# Patient Record
Sex: FEMALE | Race: WHITE | NOT HISPANIC OR LATINO | URBAN - METROPOLITAN AREA
[De-identification: names, ages, dates, MRNs, and addresses within clinical notes are randomized per-mention and may not be internally consistent; named-entity substitution may affect disease eponyms.]

---

## 2019-04-13 ENCOUNTER — EMERGENCY (EMERGENCY)
Facility: HOSPITAL | Age: 69
LOS: 1 days | Discharge: HOME | End: 2019-04-13
Attending: EMERGENCY MEDICINE
Payer: COMMERCIAL

## 2019-04-13 VITALS
HEART RATE: 92 BPM | RESPIRATION RATE: 20 BRPM | SYSTOLIC BLOOD PRESSURE: 168 MMHG | TEMPERATURE: 99 F | OXYGEN SATURATION: 97 % | DIASTOLIC BLOOD PRESSURE: 85 MMHG

## 2019-04-13 PROCEDURE — 99218: CPT

## 2019-04-13 PROCEDURE — 70450 CT HEAD/BRAIN W/O DYE: CPT | Mod: 26

## 2019-04-13 NOTE — ED STATDOCS - PROGRESS NOTE DETAILS
BRITTANY Gillette NOTE: Pt evaluated at bedside. Episode totally resolved. Checked blood sugar prior to arrival, was 127 at home. Pt evaluated prior by intake physician. Otherwise HPI/PE/ROS as noted above. Will follow up plan per intake physician. BRITTANY Gillette NOTE: Reviewed Ct with Florencia, will place pt in OBS unit for further workup.  Pt agreeable to plan.

## 2019-04-13 NOTE — ED CDU PROVIDER INITIAL DAY NOTE - PHYSICAL EXAMINATION
Neuro: sensation is normal and strength is normal. No pronator drift. Cranial nerves II-XII intact. Normal finger- nose/ heel - shin.  No aphasia/ dysarthria/ normal naming.

## 2019-04-13 NOTE — ED CDU PROVIDER INITIAL DAY NOTE - ATTENDING CONTRIBUTION TO CARE
I personally saw the patient with the PA, and completed the key components of the history and physical exam. I then discussed the management plan with the PA.      Agree with plan as pt c/o new onset visual changes inconsistent with prior ocular migraine; neurologically intact.

## 2019-04-13 NOTE — ED STATDOCS - NEUROLOGICAL, MLM
sensation is normal and strength is normal. No pronator drift. Cranial nerves intact. sensation is normal and strength is normal. No pronator drift. Cranial nerves II-XII intact. Normal finger- nose/ heel - shin.  No aphasia/ dysarthria/ normal naming.

## 2019-04-13 NOTE — ED STATDOCS - MUSCULOSKELETAL, MLM
range of motion is not limited and there is no muscle tenderness. 5/5 strength RYAN extremities, sensation is normal in RYAN extremities

## 2019-04-13 NOTE — ED ADULT TRIAGE NOTE - CHIEF COMPLAINT QUOTE
Pt reports seeing "color waves and spots" after lifting her head up fast 1 hour ago, symptom has since resolved. Hx DM and HTN

## 2019-04-13 NOTE — ED STATDOCS - CLINICAL SUMMARY MEDICAL DECISION MAKING FREE TEXT BOX
67 y/o F with PMHx of HTN, DM and HLD , presents to ED, c/o visual changes in the left eye. Obtain CT head. Outpatient, PCP, and ophthalmologist follow up. 67 y/o F with PMHx of HTN, DM and HLD , optic migraine, presents to ED, c/o visual changes in the left eye which were transient, lasted 3 minutes and described as similar to scintillating scotoma. Pt otherwise neurologically intact; symptoms resolved.  Will Obtain CT head, likely recommend Outpatient, PCP, and ophthalmologist follow up.

## 2019-04-13 NOTE — ED CDU PROVIDER INITIAL DAY NOTE - MEDICAL DECISION MAKING DETAILS
Pt admitted to observation for TIA workup. Will get MRI, ECHO, fasting lipids, carotid ultrasound and neurology consult.

## 2019-04-13 NOTE — ED STATDOCS - EYES, MLM
clear bilaterally.  Pupils equal, round, and reactive to light, 5/5. Peripheral vision intact, including 2 numbers. Sharp optic disc margins bilaterally. Grossly normal funduscopic exam clear bilaterally.  Pupils equal, round, and reactive to light, 5/5. Peripheral vision intact, including to numbers. Grossly normal funduscopic exam. Sharp optic disc margins bilaterally.

## 2019-04-13 NOTE — ED STATDOCS - ATTENDING CONTRIBUTION TO CARE
I, Kait Don, performed the initial face to face bedside interview with this patient regarding history of present illness, review of symptoms and relevant past medical, social and family history.  I completed an independent physical examination.  I was the initial provider who evaluated this patient. I have signed out the follow up of any pending tests (i.e. labs, radiological studies) to the ACP.  I have communicated the patient’s plan of care and disposition with the ACP.  The history, relevant review of systems, past medical and surgical history, medical decision making, and physical examination was documented by the scribe in my presence and I attest to the accuracy of the documentation.

## 2019-04-13 NOTE — ED CDU PROVIDER INITIAL DAY NOTE - OBJECTIVE STATEMENT
69 y/o F with PMHx of HTN, DM and HLD, presents to ED, c/o transient visual changes in the left eye. States she had poor choices of food today, and did not eat and drink on time. She was moving furniture today and then went into bed around 4:30pm, hunched over when she began seeing red, blue, white wavy lines out of the corner of her left eye. She then left bed and took 2 baby aspirin. She has a history of migraines and optical migraines. Now she just gets headaches from time to time. Denies, N/V, tingling in arms and legs, slurred speech, no blurry vision, weakness, neck pain, flashing lights, curtain over visual fields. Denies any trauma/ neck manipulation/ neck pain/ tinnitus. Pt states she sees her opthalmologist regularly Q6 months due to the optic migraines and has cataract to R eye.  Wears glasses. 67 y/o F with PMHx of HTN, DM and HLD, presents to ED, c/o transient visual changes in the left eye. States she had poor choices of food today, and did not eat and drink on time. She was moving furniture today and then went into bed around 4:30pm, hunched over when she began seeing red, blue, white wavy lines out of the corner of her left eye. She then left bed and took 2 baby aspirin. She has a history of migraines and optical migraines. Now she just gets headaches from time to time. Denies, N/V, tingling in arms and legs, slurred speech, no blurry vision, weakness, neck pain, flashing lights, curtain over visual fields. Denies any trauma/ neck manipulation/ neck pain/ tinnitus. Pt states she sees her opthalmologist regularly Q6 months due to the optic migraines and has cataract to R eye.  Wears glasses.  Former smoker. 69 y/o F with PMHx of HTN, DM and HLD, presents to ED, c/o transient visual changes in the left eye. States she had poor choices of food today, and did not eat and drink on time. She was moving furniture today and then went into bed around 4:30pm, hunched over when she began seeing red, blue, white wavy lines out of the corner of her left eye. She then left bed and took 2 baby aspirin. She has a history of migraines and optical migraines. Now she just gets headaches from time to time. Denies, N/V, tingling in arms and legs, slurred speech, no blurry vision, weakness, neck pain, flashing lights, curtain over visual fields. Denies any trauma/ neck manipulation/ neck pain/ tinnitus. Pt states she sees her opthalmologist regularly Q6 months due to the optic migraines and has cataract to R eye. Pt wears glasses.

## 2019-04-13 NOTE — ED STATDOCS - OBJECTIVE STATEMENT
67 y/o F with PMHx of HTN, DM and HLD , presents to ED, c/o visual changes in the left eye. States she had poor choices of food today, and did not eat and drink on time. She was moving furniture today and then went into bed around 4:30pm, hunched over when she began seeing red, blue, white wavy lines out of her left eye. She then left bed and took 2 baby aspirin. She has a history of migraines and optical migraines. Now she just gets headaches from time to time. Denies, N/V, no tingling in arms and legs, no blurry vision.  Wears glasses.   Former smoker.   Cataract surgery and breast surgery.   PCP: Owen Cantrell NP 69 y/o F with PMHx of HTN, DM and HLD, presents to ED, c/o transient visual changes in the left eye. States she had poor choices of food today, and did not eat and drink on time. She was moving furniture today and then went into bed around 4:30pm, hunched over when she began seeing red, blue, white wavy lines out of the corner of her left eye. She then left bed and took 2 baby aspirin. She has a history of migraines and optical migraines. Now she just gets headaches from time to time. Denies, N/V, tingling in arms and legs, slurred speech, no blurry vision, weakness, neck pain, flashing lights, curtain over visual fields. Denies any trauma/ neck manipulation/ neck pain/ tinnitus. Pt states she sees her opthalmologist regularly Q6 months due to the optic migraines and has cataract to R eye.  Wears glasses.   Former smoker.   Cataract surgery and breast surgery.   PCP: Owen Cantrell NP

## 2019-04-14 VITALS
OXYGEN SATURATION: 99 % | DIASTOLIC BLOOD PRESSURE: 76 MMHG | TEMPERATURE: 99 F | HEART RATE: 80 BPM | RESPIRATION RATE: 16 BRPM | SYSTOLIC BLOOD PRESSURE: 155 MMHG

## 2019-04-14 DIAGNOSIS — H26.9 UNSPECIFIED CATARACT: Chronic | ICD-10-CM

## 2019-04-14 LAB
ALBUMIN SERPL ELPH-MCNC: 4.6 G/DL — SIGNIFICANT CHANGE UP (ref 3.3–5.2)
ALP SERPL-CCNC: 93 U/L — SIGNIFICANT CHANGE UP (ref 40–120)
ALT FLD-CCNC: 11 U/L — SIGNIFICANT CHANGE UP
ANION GAP SERPL CALC-SCNC: 15 MMOL/L — SIGNIFICANT CHANGE UP (ref 5–17)
APPEARANCE UR: CLEAR — SIGNIFICANT CHANGE UP
APTT BLD: 32.5 SEC — SIGNIFICANT CHANGE UP (ref 27.5–36.3)
AST SERPL-CCNC: 13 U/L — SIGNIFICANT CHANGE UP
BACTERIA # UR AUTO: ABNORMAL
BASOPHILS # BLD AUTO: 0 K/UL — SIGNIFICANT CHANGE UP (ref 0–0.2)
BASOPHILS NFR BLD AUTO: 0.3 % — SIGNIFICANT CHANGE UP (ref 0–2)
BILIRUB SERPL-MCNC: 0.3 MG/DL — LOW (ref 0.4–2)
BILIRUB UR-MCNC: NEGATIVE — SIGNIFICANT CHANGE UP
BUN SERPL-MCNC: 20 MG/DL — SIGNIFICANT CHANGE UP (ref 8–20)
CALCIUM SERPL-MCNC: 9.3 MG/DL — SIGNIFICANT CHANGE UP (ref 8.6–10.2)
CHLORIDE SERPL-SCNC: 102 MMOL/L — SIGNIFICANT CHANGE UP (ref 98–107)
CHOLEST SERPL-MCNC: 175 MG/DL — SIGNIFICANT CHANGE UP (ref 110–199)
CO2 SERPL-SCNC: 24 MMOL/L — SIGNIFICANT CHANGE UP (ref 22–29)
COLOR SPEC: YELLOW — SIGNIFICANT CHANGE UP
CREAT SERPL-MCNC: 0.6 MG/DL — SIGNIFICANT CHANGE UP (ref 0.5–1.3)
DIFF PNL FLD: NEGATIVE — SIGNIFICANT CHANGE UP
EOSINOPHIL # BLD AUTO: 0.1 K/UL — SIGNIFICANT CHANGE UP (ref 0–0.5)
EOSINOPHIL NFR BLD AUTO: 1.4 % — SIGNIFICANT CHANGE UP (ref 0–6)
EPI CELLS # UR: SIGNIFICANT CHANGE UP
GLUCOSE SERPL-MCNC: 120 MG/DL — HIGH (ref 70–115)
GLUCOSE UR QL: NEGATIVE MG/DL — SIGNIFICANT CHANGE UP
HCT VFR BLD CALC: 41.1 % — SIGNIFICANT CHANGE UP (ref 37–47)
HDLC SERPL-MCNC: 74 MG/DL — SIGNIFICANT CHANGE UP
HGB BLD-MCNC: 13.5 G/DL — SIGNIFICANT CHANGE UP (ref 12–16)
INR BLD: 1.14 RATIO — SIGNIFICANT CHANGE UP (ref 0.88–1.16)
KETONES UR-MCNC: NEGATIVE — SIGNIFICANT CHANGE UP
LEUKOCYTE ESTERASE UR-ACNC: ABNORMAL
LIPID PNL WITH DIRECT LDL SERPL: 83 MG/DL — SIGNIFICANT CHANGE UP
LYMPHOCYTES # BLD AUTO: 2.1 K/UL — SIGNIFICANT CHANGE UP (ref 1–4.8)
LYMPHOCYTES # BLD AUTO: 32.4 % — SIGNIFICANT CHANGE UP (ref 20–55)
MCHC RBC-ENTMCNC: 28.2 PG — SIGNIFICANT CHANGE UP (ref 27–31)
MCHC RBC-ENTMCNC: 32.8 G/DL — SIGNIFICANT CHANGE UP (ref 32–36)
MCV RBC AUTO: 85.8 FL — SIGNIFICANT CHANGE UP (ref 81–99)
MONOCYTES # BLD AUTO: 0.6 K/UL — SIGNIFICANT CHANGE UP (ref 0–0.8)
MONOCYTES NFR BLD AUTO: 8.7 % — SIGNIFICANT CHANGE UP (ref 3–10)
NEUTROPHILS # BLD AUTO: 3.7 K/UL — SIGNIFICANT CHANGE UP (ref 1.8–8)
NEUTROPHILS NFR BLD AUTO: 56.9 % — SIGNIFICANT CHANGE UP (ref 37–73)
NITRITE UR-MCNC: NEGATIVE — SIGNIFICANT CHANGE UP
PH UR: 5 — SIGNIFICANT CHANGE UP (ref 5–8)
PLATELET # BLD AUTO: 220 K/UL — SIGNIFICANT CHANGE UP (ref 150–400)
POTASSIUM SERPL-MCNC: 4.2 MMOL/L — SIGNIFICANT CHANGE UP (ref 3.5–5.3)
POTASSIUM SERPL-SCNC: 4.2 MMOL/L — SIGNIFICANT CHANGE UP (ref 3.5–5.3)
PROT SERPL-MCNC: 8.1 G/DL — SIGNIFICANT CHANGE UP (ref 6.6–8.7)
PROT UR-MCNC: NEGATIVE MG/DL — SIGNIFICANT CHANGE UP
PROTHROM AB SERPL-ACNC: 13.2 SEC — HIGH (ref 10–12.9)
RBC # BLD: 4.79 M/UL — SIGNIFICANT CHANGE UP (ref 4.4–5.2)
RBC # FLD: 14.6 % — SIGNIFICANT CHANGE UP (ref 11–15.6)
RBC CASTS # UR COMP ASSIST: SIGNIFICANT CHANGE UP /HPF (ref 0–4)
SODIUM SERPL-SCNC: 141 MMOL/L — SIGNIFICANT CHANGE UP (ref 135–145)
SP GR SPEC: 1.01 — SIGNIFICANT CHANGE UP (ref 1.01–1.02)
TOTAL CHOLESTEROL/HDL RATIO MEASUREMENT: 2 RATIO — LOW (ref 3.3–7.1)
TRIGL SERPL-MCNC: 92 MG/DL — SIGNIFICANT CHANGE UP (ref 10–200)
TROPONIN T SERPL-MCNC: <0.01 NG/ML — SIGNIFICANT CHANGE UP (ref 0–0.06)
TROPONIN T SERPL-MCNC: <0.01 NG/ML — SIGNIFICANT CHANGE UP (ref 0–0.06)
UROBILINOGEN FLD QL: NEGATIVE MG/DL — SIGNIFICANT CHANGE UP
WBC # BLD: 6.4 K/UL — SIGNIFICANT CHANGE UP (ref 4.8–10.8)
WBC # FLD AUTO: 6.4 K/UL — SIGNIFICANT CHANGE UP (ref 4.8–10.8)
WBC UR QL: SIGNIFICANT CHANGE UP

## 2019-04-14 PROCEDURE — 80053 COMPREHEN METABOLIC PANEL: CPT

## 2019-04-14 PROCEDURE — 99220: CPT

## 2019-04-14 PROCEDURE — 99284 EMERGENCY DEPT VISIT MOD MDM: CPT | Mod: 25

## 2019-04-14 PROCEDURE — 93880 EXTRACRANIAL BILAT STUDY: CPT | Mod: 26

## 2019-04-14 PROCEDURE — 70551 MRI BRAIN STEM W/O DYE: CPT | Mod: 26

## 2019-04-14 PROCEDURE — 81001 URINALYSIS AUTO W/SCOPE: CPT

## 2019-04-14 PROCEDURE — 93306 TTE W/DOPPLER COMPLETE: CPT | Mod: 26

## 2019-04-14 PROCEDURE — 93010 ELECTROCARDIOGRAM REPORT: CPT

## 2019-04-14 PROCEDURE — 70551 MRI BRAIN STEM W/O DYE: CPT

## 2019-04-14 PROCEDURE — 99217: CPT

## 2019-04-14 PROCEDURE — 85730 THROMBOPLASTIN TIME PARTIAL: CPT

## 2019-04-14 PROCEDURE — 93306 TTE W/DOPPLER COMPLETE: CPT

## 2019-04-14 PROCEDURE — 93880 EXTRACRANIAL BILAT STUDY: CPT

## 2019-04-14 PROCEDURE — 85610 PROTHROMBIN TIME: CPT

## 2019-04-14 PROCEDURE — 85027 COMPLETE CBC AUTOMATED: CPT

## 2019-04-14 PROCEDURE — 80061 LIPID PANEL: CPT

## 2019-04-14 PROCEDURE — G0378: CPT

## 2019-04-14 PROCEDURE — 93005 ELECTROCARDIOGRAM TRACING: CPT

## 2019-04-14 PROCEDURE — 36415 COLL VENOUS BLD VENIPUNCTURE: CPT

## 2019-04-14 PROCEDURE — 84484 ASSAY OF TROPONIN QUANT: CPT

## 2019-04-14 PROCEDURE — 70450 CT HEAD/BRAIN W/O DYE: CPT

## 2019-04-14 RX ORDER — ATORVASTATIN CALCIUM 80 MG/1
20 TABLET, FILM COATED ORAL AT BEDTIME
Qty: 0 | Refills: 0 | Status: DISCONTINUED | OUTPATIENT
Start: 2019-04-14 | End: 2019-04-18

## 2019-04-14 RX ORDER — LISINOPRIL 2.5 MG/1
40 TABLET ORAL DAILY
Qty: 0 | Refills: 0 | Status: DISCONTINUED | OUTPATIENT
Start: 2019-04-14 | End: 2019-04-18

## 2019-04-14 NOTE — ED ADULT NURSE REASSESSMENT NOTE - NS ED NURSE REASSESS COMMENT FT1
assumed pt care from previous Rn Adalberto Cormier.  pt transported to CDU-8 for observation. pt  A&Ox3;  pt stated she was moving some  furniture in her  house, then started to experience some red and blue lines across her left eye lasting about 30mins. denies hitting head or any trauma.  pt also stated that she took a 2 baby aspirin and called her friend who insisted she come  to emergency room.   pt verbalizes improvement since coming to ED. At this time pt denies headache, vision changes, Chest pain or SOB. B/L lungs clear, normal s1&s2 heard on ausculation. (+) pedal pulses, skin warm/dry intact. IV patent left AC. VSS and documented as per flow sheet.  plan of care reviewed and pt verbalizes understanding. bed in lowest position, call bell within reach and safety maintained. monitoring ongoing for any changes.

## 2019-04-14 NOTE — ED ADULT NURSE REASSESSMENT NOTE - NS ED NURSE REASSESS COMMENT FT1
Report received from CDU RN Rafal Wright. Assumed care of patient. No obvious distress noted. Pt resting comfortably in bed.

## 2019-04-14 NOTE — CONSULT NOTE ADULT - SUBJECTIVE AND OBJECTIVE BOX
[facility]                                     [facility] [place]                                 Kiana Preciado, [address]                                  [address]                                        [place], [place], [address]                                             [phone]    CC: vision changes  HPI: The patient is a 68y Female who presented with scintillated squiggly lines in her peripheral left visual field yesterday for about 30 minutes.  there was no subsequent headache.  She has a history of migraines and optical migraines in the past, but they have appeared differnetly in past.  She is currently aty her neurologic baseline.  Neurology eval is requested.    PAST MEDICAL & SURGICAL HISTORY:  DM (diabetes mellitus)  High cholesterol  Hypertension  Acute cataract      MEDICATIONS  (STANDING):  atorvastatin 20 milliGRAM(s) Oral at bedtime  lisinopril 40 milliGRAM(s) Oral daily  sitaGLIPtin 50 milliGRAM(s) Oral daily    MEDICATIONS  (PRN):      Allergies    Biaxin (Rash)    Intolerances        SOCIAL HISTORY:  former tob,   no alcohol   no drugs    FAMILY HISTORY:  n/c      ROS: 14 point ROS negative other than what is present in HPI or below    Vital Signs Last 24 Hrs  T(C): 37.1 (14 Apr 2019 10:47), Max: 37.1 (14 Apr 2019 10:47)  T(F): 98.7 (14 Apr 2019 10:47), Max: 98.7 (14 Apr 2019 10:47)  HR: 80 (14 Apr 2019 10:47) (72 - 92)  BP: 155/76 (14 Apr 2019 10:47) (130/78 - 168/85)  BP(mean): --  RR: 16 (14 Apr 2019 10:47) (16 - 20)  SpO2: 99% (14 Apr 2019 10:47) (97% - 100%)      General: NAD    Detailed Neurologic Exam:    Mental status: The patient is awake and alert and has normal attention span.  The patient is fully oriented in 3 spheres. The patient is oriented to current events. The patient is able to name objects, follow commands, repeat sentences.    Cranial nerves: Pupils equal and react symmetrically to light. There is no visual field deficit to confrontation. Extraocular motion is full with no nystagmus. There is no ptosis. Facial sensation is intact. Facial musculature is symmetric. Palate elevates symmetrically. Shoulder shrug is normal. Tongue is midline.    Motor: There is normal bulk and tone.  There is no tremor.  Strength is 5/5 in the right arm and leg.   Strength is 5/5 in the left arm and leg.    Sensation: Intact to light touch and pin in 4 extremities    Reflexes: 2+ throughout and plantar responses are flexor.    Cerebellar: There is no dysmetria on finger to nose testing.    Gait : deferred    LABS:                         13.5   6.4   )-----------( 220      ( 14 Apr 2019 01:02 )             41.1       04-14    141  |  102  |  20.0  ----------------------------<  120<H>  4.2   |  24.0  |  0.60    Ca    9.3      14 Apr 2019 01:02    TPro  8.1  /  Alb  4.6  /  TBili  0.3<L>  /  DBili  x   /  AST  13  /  ALT  11  /  AlkPhos  93  04-14      PT/INR - ( 14 Apr 2019 01:01 )   PT: 13.2 sec;   INR: 1.14 ratio         PTT - ( 14 Apr 2019 01:01 )  PTT:32.5 sec    RADIOLOGY & ADDITIONAL STUDIES (independently reviewed unless otherwise noted):  MRI brain- no acute CVA. mass or blood    carotid duplex no significant stenosis    < from: TTE Echo Complete w/Doppler (04.14.19 @ 09:06) >  Summary:   1. Left ventricular ejection fraction, by visual estimation, is 60 to   65%.   2. Spectral Doppler shows impaired relaxation pattern of left   ventricular myocardial filling (Grade I diastolic dysfunction).   3. Mild mitral valve regurgitation.   4. Mild tricuspid valve regurgitation.

## 2019-04-14 NOTE — ED ADULT NURSE REASSESSMENT NOTE - NS ED NURSE REASSESS COMMENT FT1
Pt alert and oriented. resting in stretcher, no signs of distress noted. Handoff report given to Craig Stewart and Abimbola Foy RN and pt's safety maintained. RN with no questions or concerns at this time

## 2019-04-14 NOTE — ED ADULT NURSE REASSESSMENT NOTE - NS ED NURSE REASSESS COMMENT FT1
Pt returned from testing, awaiting further testing. No neuro deficits noted. Pt denies pain, has no complaints, in no obvious distress, resting comfortably in bed. NSR on CM. Will continue to monitor.

## 2019-04-14 NOTE — ED CDU PROVIDER SUBSEQUENT DAY NOTE - HISTORY
69 y/o F with PMHx of HTN, DM and HLD, presents to ED, c/o transient visual changes in the left eye. States she had poor choices of food today, and did not eat and drink on time. She was moving furniture today and then went into bed around 4:30pm, hunched over when she began seeing red, blue, white wavy lines out of the corner of her left eye. She then left bed and took 2 baby aspirin. She has a history of migraines and optical migraines that have since resolved.   Denies any visual disturbances.  Pt states she sees her ophthalmologist regularly Q6 months due to the optic migraines and has cataract to R eye.  Wears glasses.

## 2019-04-14 NOTE — ED ADULT NURSE REASSESSMENT NOTE - NS ED NURSE REASSESS COMMENT FT1
Purposeful rounding completed on patient. pt ambulated to/from bathroom with steady gait. denies any complaints of chest pain, SOB or dizziness. resting in stretcher no apparent distress noted.  VSS and documented as per flow sheet.  plan care of care reviewed. Bed in lowest position, call bell within reach, and safety maintained. monitoring on-going for any changes.

## 2019-04-14 NOTE — ED ADULT NURSE REASSESSMENT NOTE - NS ED NURSE REASSESS COMMENT FT1
Pt A&Ox4, VSS, no neurological deficits noted, behavior appropriate to situation, PERRL. Pt denies any vision changes/dizziness/HA. Pt denies any pain. Awaiting results and final dispo. Resting comfortably in bed. Will continue to monitor.

## 2019-04-14 NOTE — ED CDU PROVIDER DISPOSITION NOTE - CLINICAL COURSE
69 y/o F presented to ED with self limiting episode of visual disturbance.  CT head, MRI, carotid dopplers unremarkable, TTE without acute pathology.  Seen by neurology and dx migraine with aura.  Will d/c home with f/u pcp Dr. Carmona.

## 2019-04-14 NOTE — ED ADULT NURSE REASSESSMENT NOTE - NSIMPLEMENTINTERV_GEN_ALL_ED
Implemented All Universal Safety Interventions:  [place] to call system. Call bell, personal items and telephone within reach. Instruct patient to call for assistance. Room bathroom lighting operational. Non-slip footwear when patient is off stretcher. Physically safe environment: no spills, clutter or unnecessary equipment. Stretcher in lowest position, wheels locked, appropriate side rails in place.

## 2019-04-14 NOTE — ED CDU PROVIDER DISPOSITION NOTE - ATTENDING CONTRIBUTION TO CARE
seen with acp  neurologically stable workup is negative  Imp migraine  Agree with acps assessment hx and physical

## 2019-04-14 NOTE — CONSULT NOTE ADULT - ASSESSMENT
The patient is a 68y Female who is followed by neurology because of vision changes    Visual disturbance  Her symptoms are consistent with migraine aura  MRI brain did not show acute pathology  would not start any medications for this at this time    Neurologically cleared for discharge.  d/w Pam Barr NP    Thank you for allowing me to participate in the care of your patient    Aung Bruno MD, PhD   [phone]

## 2019-04-14 NOTE — ED ADULT NURSE REASSESSMENT NOTE - NS ED NURSE REASSESS COMMENT FT1
Pt prepared for and awaiting MRI and echo. Pt verbalized understanding of plan of care and wait time. Pt denies PO BP med and PO DM med; pt stated at home she regularly takes these meds at night.

## 2019-04-14 NOTE — ED CDU PROVIDER SUBSEQUENT DAY NOTE - ATTENDING CONTRIBUTION TO CARE
seen with acp  c/o transient loss of vision  MRI neuro consult obtained  carotid ultrasound pending  patient is clinically stable  Agree with acps assessment hx and physical

## 2019-04-15 PROBLEM — Z00.00 ENCOUNTER FOR PREVENTIVE HEALTH EXAMINATION: Status: ACTIVE | Noted: 2019-04-15

## 2022-09-13 ENCOUNTER — NON-APPOINTMENT (OUTPATIENT)
Age: 72
End: 2022-09-13

## 2022-09-13 ENCOUNTER — APPOINTMENT (OUTPATIENT)
Dept: OPHTHALMOLOGY | Facility: CLINIC | Age: 72
End: 2022-09-13

## 2022-09-13 PROCEDURE — 92014 COMPRE OPH EXAM EST PT 1/>: CPT

## 2023-08-07 ENCOUNTER — APPOINTMENT (OUTPATIENT)
Dept: OPHTHALMOLOGY | Facility: CLINIC | Age: 73
End: 2023-08-07
Payer: MEDICARE

## 2023-08-07 ENCOUNTER — NON-APPOINTMENT (OUTPATIENT)
Age: 73
End: 2023-08-07

## 2023-08-07 PROCEDURE — 92012 INTRM OPH EXAM EST PATIENT: CPT

## 2023-08-07 PROCEDURE — 92201 OPSCPY EXTND RTA DRAW UNI/BI: CPT

## 2023-09-08 ENCOUNTER — APPOINTMENT (OUTPATIENT)
Dept: OPHTHALMOLOGY | Facility: CLINIC | Age: 73
End: 2023-09-08
Payer: MEDICARE

## 2023-09-08 ENCOUNTER — NON-APPOINTMENT (OUTPATIENT)
Age: 73
End: 2023-09-08

## 2023-09-08 PROCEDURE — 92201 OPSCPY EXTND RTA DRAW UNI/BI: CPT

## 2023-09-08 PROCEDURE — 92014 COMPRE OPH EXAM EST PT 1/>: CPT

## 2024-04-10 ENCOUNTER — APPOINTMENT (OUTPATIENT)
Dept: OPHTHALMOLOGY | Facility: CLINIC | Age: 74
End: 2024-04-10

## 2024-06-24 ENCOUNTER — NON-APPOINTMENT (OUTPATIENT)
Age: 74
End: 2024-06-24

## 2024-06-24 ENCOUNTER — APPOINTMENT (OUTPATIENT)
Dept: OPHTHALMOLOGY | Facility: CLINIC | Age: 74
End: 2024-06-24
Payer: MEDICARE

## 2024-06-24 PROCEDURE — 92014 COMPRE OPH EXAM EST PT 1/>: CPT

## 2025-06-24 ENCOUNTER — APPOINTMENT (OUTPATIENT)
Dept: OPHTHALMOLOGY | Facility: CLINIC | Age: 75
End: 2025-06-24
Payer: MEDICARE

## 2025-06-24 ENCOUNTER — NON-APPOINTMENT (OUTPATIENT)
Age: 75
End: 2025-06-24

## 2025-06-24 PROCEDURE — 92014 COMPRE OPH EXAM EST PT 1/>: CPT
